# Patient Record
Sex: MALE | Race: WHITE
[De-identification: names, ages, dates, MRNs, and addresses within clinical notes are randomized per-mention and may not be internally consistent; named-entity substitution may affect disease eponyms.]

---

## 2021-12-31 ENCOUNTER — HOSPITAL ENCOUNTER (EMERGENCY)
Dept: HOSPITAL 61 - ER | Age: 26
Discharge: HOME | End: 2021-12-31
Payer: COMMERCIAL

## 2021-12-31 VITALS — WEIGHT: 187.61 LBS | HEIGHT: 72 IN | BODY MASS INDEX: 25.41 KG/M2

## 2021-12-31 VITALS — DIASTOLIC BLOOD PRESSURE: 65 MMHG | SYSTOLIC BLOOD PRESSURE: 140 MMHG

## 2021-12-31 DIAGNOSIS — J03.90: Primary | ICD-10-CM

## 2021-12-31 DIAGNOSIS — F90.9: ICD-10-CM

## 2021-12-31 PROCEDURE — 99283 EMERGENCY DEPT VISIT LOW MDM: CPT

## 2021-12-31 PROCEDURE — 87070 CULTURE OTHR SPECIMN AEROBIC: CPT

## 2021-12-31 PROCEDURE — 87880 STREP A ASSAY W/OPTIC: CPT

## 2021-12-31 NOTE — PHYS DOC
Past Medical History


Additional Past Medical Histor:  ADHD


Past Surgical History:  Other


Additional Past Surgical Histo:  ORAL


Smoking Status:  Never Smoker


Alcohol Use:  Occasionally





General Adult


EDM:


Chief Complaint:  SORE THROAT





HPI:


HPI:





Patient is a 26  year old male with several previous episodes of tonsillitis who

presents with sore throat.


Symptoms started approximately 36 hours ago.  Have progressively worsened.  Pain

with swallowing.


Denies fever/chills, pain with head movement, pain with mouth opening, 

difficulty tolerating secretions, or noisy breathing.


Denies sick contacts.


States that he has been treated with antibiotics for tonsillitis in the past few

months.


Has not seen an ENT.





Review of Systems:


Review of Systems:


Constitutional:   Denies fever or chills. []


Eyes:   Denies change in visual acuity. []


HENT:   Reports sore throat. [] 


Respiratory:   Denies cough or shortness of breath. [] 


Cardiovascular:   Denies chest pain or edema. [] 


GI:   Denies abdominal pain, nausea, vomiting, bloody stools or diarrhea. [] 


:  Denies dysuria. [] 


Musculoskeletal:   Denies back pain or joint pain. [] 


Integument:   Denies rash. [] 


Neurologic:   Denies headache, focal weakness or sensory changes. [] 


Endocrine:   Denies polyuria or polydipsia. [] 


Lymphatic: Reports right-sided swollen lymph node


Psychiatric:  Denies depression or anxiety. []





Heart Score:


C/O Chest Pain:  No





Allergies:


Allergies:





Allergies








Coded Allergies Type Severity Reaction Last Updated Verified


 


  No Known Drug Allergies    12/31/21 No











Physical Exam:


PE:





Constitutional: Well developed, well nourished, no acute distress, non-toxic 

appearance. []


HENT: Bilateral tonsillar enlargement and exudates, right tonsil slightly larger

 than left.  Uvula is midline.  No peritonsillar fullness.  Posterior erythema 

and cobblestoning present.  Moves head and neck freely without evidence of pain.

  No trismus.


Neck: Normal range of motion, no tenderness, supple, no stridor.  Mild tender 

right-sided anterior cervical lymphadenopathy.  [] 


Cardiovascular:Heart rate regular rhythm, no murmur []


Lungs & Thorax:  Bilateral breath sounds clear to auscultation []


Abdomen: Bowel sounds normal, soft, no tenderness, no masses, no pulsatile 

masses. [] 


Skin: Warm, dry, no erythema, no rash. [] 


Back: No tenderness, no CVA tenderness. [] 


Extremities: No tenderness, no cyanosis, no clubbing, ROM intact, no edema. [] 


Neurologic: Alert and oriented X 3, normal motor function, normal sensory 

function, no focal deficits noted. []


Psychologic: Affect normal, judgement normal, mood normal. []





Current Patient Data:


Vital Signs:





                                   Vital Signs








  Date Time  Temp Pulse Resp B/P (MAP) Pulse Ox O2 Delivery O2 Flow Rate FiO2


 


12/31/21 08:57 98.2 98 16 140/65 (90) 98 Room Air  





 98.2       











EKG:


EKG:


[]





Radiology/Procedures:


Radiology/Procedures:


[]





Course & Med Decision Making:


Course & Med Decision Making


Pertinent Labs and Imaging studies reviewed. (See chart for details)





Patient a 26-year-old male who presents with evidence of recurrent tonsillitis.


Afebrile, well-appearing, without trismus, pain with head movement, uvular 

deviation, or difficulty with swallowing.  Low suspicion for abscess.


He does have bilateral exudates.  Rapid strep test negative.  Sent for throat 

culture.  Will defer antibiotics.


Considered mononucleosis, however with 2 days of symptoms testing and have a 

high likelihood for false negative results.  I discussed this with patient, and 

advised that he may require mono testing if this continues.


Provided with 5 days of prednisone and given the recurrent nature he was given 

information for ENT follow-up.


Return precautions discussed for trismus, pain with neck/head ROM, high fever, 

or noisy/difficulty breathing.


[id number]





Dragon Disclaimer:


Dragon Disclaimer:


This electronic medical record was generated, in whole or in part, using a voice

 recognition dictation system.





Departure


Departure


Impression:  


   Primary Impression:  


   Acute infective tonsillitis


Disposition:  01 HOME / SELF CARE / HOMELESS


Condition:  STABLE


Referrals:  


TAMMIE SPENCER MD


Schedule appointment with Dr. Spencer





Additional Instructions:  


Your strep test was negative.


Please  the steroids prescribed to your pharmacy.  Please take full 

course.


For pain tylenol 1000 mg every 6 hours (do not exceed 4000 mg in one day)





After your steroids are done you can then use ibuprofen.  Do not take them 

together.





We will call you if your throat culture grows a bacteria that you will need 

antibiotics for.








Please return to the emergency department if you have severe pain with opening 

your mouth, moving your head/neck, or you develop shortness of breath.


Scripts


Prednisone (PREDNISONE) 50 Mg Tablet


1 TAB PO DAILY, #5 TAB 0 Refills


   Prov: CHRIS VIRK MD         12/31/21











CHRIS VIRK MD              Dec 31, 2021 09:51